# Patient Record
Sex: FEMALE | Race: BLACK OR AFRICAN AMERICAN | NOT HISPANIC OR LATINO
[De-identification: names, ages, dates, MRNs, and addresses within clinical notes are randomized per-mention and may not be internally consistent; named-entity substitution may affect disease eponyms.]

---
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
## 2023-03-14 ENCOUNTER — APPOINTMENT (OUTPATIENT)
Dept: OBGYN | Facility: CLINIC | Age: 23
End: 2023-03-14
Payer: COMMERCIAL

## 2023-03-14 ENCOUNTER — NON-APPOINTMENT (OUTPATIENT)
Age: 23
End: 2023-03-14

## 2023-03-14 VITALS
DIASTOLIC BLOOD PRESSURE: 74 MMHG | BODY MASS INDEX: 57.52 KG/M2 | HEIGHT: 60 IN | WEIGHT: 293 LBS | SYSTOLIC BLOOD PRESSURE: 110 MMHG

## 2023-03-14 DIAGNOSIS — Z01.419 ENCOUNTER FOR GYNECOLOGICAL EXAMINATION (GENERAL) (ROUTINE) W/OUT ABNORMAL FINDINGS: ICD-10-CM

## 2023-03-14 PROCEDURE — 99385 PREV VISIT NEW AGE 18-39: CPT

## 2023-03-14 PROCEDURE — 81025 URINE PREGNANCY TEST: CPT

## 2023-03-14 NOTE — PHYSICAL EXAM
[Chaperone Present] : A chaperone was present in the examining room during all aspects of the physical examination [FreeTextEntry1] : LALA Marinelli [Appropriately responsive] : appropriately responsive [Alert] : alert [No Acute Distress] : no acute distress [Soft] : soft [Non-tender] : non-tender [Non-distended] : non-distended [Oriented x3] : oriented x3 [FreeTextEntry6] : unremarkable  [Examination Of The Breasts] : a normal appearance [No Masses] : no breast masses were palpable [Labia Majora] : normal [Labia Minora] : normal [Normal] : normal [Retroversion] : retroverted [Uterine Adnexae] : normal [FreeTextEntry5] : friability of cervix noted with minimal bleeding just on speculum exam

## 2023-03-14 NOTE — COUNSELING
[Nutrition/ Exercise/ Weight Management] : nutrition, exercise, weight management [Vitamins/Supplements] : vitamins/supplements [Contraception/ Emergency Contraception/ Safe Sexual Practices] : contraception, emergency contraception, safe sexual practices [Medication Management] : medication management [STD (testing, results, tx)] : STD (testing, results, tx) [Vaccines] : vaccines [HPV Vaccine] : HPV Vaccine

## 2023-03-14 NOTE — DISCUSSION/SUMMARY
[FreeTextEntry1] : Tiff is a 21 y/o G0 LMP 2/19/23 who presents for annual exam. \par \par #Well Woman Visit \par 1. Nutrition/Activity: The benefits of adequate calcium intake and a daily multivitamin along with routine daily cardiovascular exercise were reviewed with the patient. \par 2. Health Screening: She was informed of the benefits of a screening colonoscopy/DEXA/Mammo/Pap.\par - Cervix: We reviewed ASCCP/ACOG guidelines for pap smear screening. Discussed screening guidelines, PAP collected today. \par 3. Sex Health:  The importance of safe-sex practices was discussed with the patient. STD screening was offered to patient, she agreed to GC/CT, declined HIV/RPR because she does not do well with blood draws. \par 4. Contraception: does not desire contraception at this time/wishes to continue with __ for contraception.\par \par #Gardisil counseling \par - counseled patient about HPV (Human Papilloma Virus) including prevalence, mode of transmission, asymptomatic nature, and indolent/intermittent life course of virus \par - discussed it's association to increased lifetime risk of cervical cancer due to chronic inflammatory changes at cervical site\par - discussed 50+ strands with 9 high risks strands identified \par - discussed general recommendation for Gardisil vaccination for coverage/prevention/prophylaxis of contraction of the 9 high risk strands\par - discussed general recommendation to vaccinate even if already HPV positive to prevent contraction of another high risk or non-high risk strand \par - discussed possible out-of-pocket expenses, $200 per vaccine dose (2-3 total depending on age) \par - discussed calling insurance to confirm coverage if pertinent to decision to get vaccinated \par - she verbalized understanding and will think about it and discuss it with her mom \par \par #Contraception counseling \par - provided general counseling about safe sex practices and barrier protection \par - discussed contraceptive options including OCP's, DMPA, and implants (Nexplanon, IUD) \par - discussed mechanism of action, administration routes, known risks/benefits, efficacy rates, and immediate/delayed side effect profiles of each \par - does not desire at this time but will think about it once she starts penetrative sexual activity \par \par #HIV/RPR screening \par - discussed importance of STD testing \par - patient says she desires to be screening but does not do well with blood draws \par - will come back with her mom for blood draws above \par \par She verbalized understanding and agreement with above counseling regarding differential diagnosis, evaluation, and plan. \par She was given time for questions/concerns which were all answered to her apparent satisfaction.\par All designated lab work for today drawn in office. \par \par RTO 1yr for next annual  or sooner for HIV/RPR/contraception/gardisil

## 2023-03-14 NOTE — HISTORY OF PRESENT ILLNESS
[N] : Patient denies prior pregnancies [Menarche Age: ____] : age at menarche was [unfilled] [No] : Patient does not have concerns regarding sex [Currently Active] : currently active [Men] : men [Oral] : oral [LMPDate] : 02/19/23 [PGHxTotal] : 0 [FreeTextEntry1] : 02/19/23

## 2023-03-18 LAB
BACTERIA UR CULT: NORMAL
C TRACH RRNA SPEC QL NAA+PROBE: NOT DETECTED
CYTOLOGY CVX/VAG DOC THIN PREP: ABNORMAL
HCG UR QL: NEGATIVE
HPV HIGH+LOW RISK DNA PNL CVX: NOT DETECTED
N GONORRHOEA RRNA SPEC QL NAA+PROBE: NOT DETECTED
QUALITY CONTROL: YES
SOURCE AMPLIFICATION: NORMAL

## 2023-03-20 ENCOUNTER — NON-APPOINTMENT (OUTPATIENT)
Age: 23
End: 2023-03-20

## 2023-03-20 DIAGNOSIS — A59.01 TRICHOMONAL VULVOVAGINITIS: ICD-10-CM

## 2023-03-23 ENCOUNTER — NON-APPOINTMENT (OUTPATIENT)
Age: 23
End: 2023-03-23

## 2023-03-23 PROBLEM — A59.01 TRICHOMONAS VAGINITIS: Status: ACTIVE | Noted: 2023-03-23

## 2023-03-28 ENCOUNTER — TRANSCRIPTION ENCOUNTER (OUTPATIENT)
Age: 23
End: 2023-03-28

## 2023-03-28 ENCOUNTER — NON-APPOINTMENT (OUTPATIENT)
Age: 23
End: 2023-03-28

## 2023-03-28 DIAGNOSIS — B96.89 ACUTE VAGINITIS: ICD-10-CM

## 2023-03-28 DIAGNOSIS — N76.0 ACUTE VAGINITIS: ICD-10-CM

## 2023-03-29 LAB
CANDIDA VAG CYTO: NOT DETECTED
G VAGINALIS+PREV SP MTYP VAG QL MICRO: DETECTED
T VAGINALIS VAG QL WET PREP: DETECTED

## 2023-03-29 RX ORDER — METRONIDAZOLE 7.5 MG/G
0.75 GEL VAGINAL
Qty: 1 | Refills: 0 | Status: ACTIVE | COMMUNITY
Start: 2023-03-28

## 2023-03-30 RX ORDER — METRONIDAZOLE 500 MG/1
500 TABLET ORAL TWICE DAILY
Qty: 14 | Refills: 0 | Status: ACTIVE | COMMUNITY
Start: 2023-03-23

## 2024-03-15 ENCOUNTER — APPOINTMENT (OUTPATIENT)
Dept: OBGYN | Facility: CLINIC | Age: 24
End: 2024-03-15